# Patient Record
Sex: FEMALE | Race: WHITE | NOT HISPANIC OR LATINO | URBAN - NONMETROPOLITAN AREA
[De-identification: names, ages, dates, MRNs, and addresses within clinical notes are randomized per-mention and may not be internally consistent; named-entity substitution may affect disease eponyms.]

---

## 2020-07-23 LAB
CLINICAL INFO: NORMAL
CYTO CVX: NORMAL
CYTOLOGY CMNT CVX/VAG CYTO-IMP: NORMAL
DATE PREVIOUS BX: NORMAL
LMP START DATE: NORMAL
MICROORGANISM CVX/VAG CYTO: NORMAL
SL AMB PREV. PAP:: NORMAL
SPECIMEN SOURCE CVX/VAG CYTO: NORMAL

## 2020-08-06 LAB
C TRACH RRNA SPEC QL NAA+PROBE: NOT DETECTED
N GONORRHOEA RRNA SPEC QL NAA+PROBE: NOT DETECTED

## 2023-10-09 DIAGNOSIS — Q23.1 CONGENITAL INSUFFICIENCY OF AORTIC VALVE: ICD-10-CM

## 2023-10-09 DIAGNOSIS — I35.0 NONRHEUMATIC AORTIC (VALVE) STENOSIS: ICD-10-CM

## 2024-03-19 ENCOUNTER — HOSPITAL ENCOUNTER (OUTPATIENT)
Dept: CT IMAGING | Facility: HOSPITAL | Age: 29
Discharge: HOME | End: 2024-03-19
Attending: INTERNAL MEDICINE
Payer: COMMERCIAL

## 2024-03-19 DIAGNOSIS — I71.21 ANEURYSM OF THE ASCENDING AORTA, WITHOUT RUPTURE (HCC): ICD-10-CM

## 2024-03-19 PROCEDURE — 71275 CT ANGIOGRAPHY CHEST: CPT

## 2024-03-19 RX ADMIN — IOHEXOL 85 ML: 350 INJECTION, SOLUTION INTRAVENOUS at 08:56

## 2024-04-05 ENCOUNTER — HOSPITAL ENCOUNTER (OUTPATIENT)
Dept: NON INVASIVE DIAGNOSTICS | Facility: HOSPITAL | Age: 29
Discharge: HOME | End: 2024-04-05
Attending: INTERNAL MEDICINE
Payer: COMMERCIAL

## 2024-04-05 VITALS — WEIGHT: 150 LBS | HEIGHT: 65 IN | BODY MASS INDEX: 24.99 KG/M2

## 2024-04-05 DIAGNOSIS — Q23.1 CONGENITAL INSUFFICIENCY OF AORTIC VALVE: ICD-10-CM

## 2024-04-05 DIAGNOSIS — I35.0 NONRHEUMATIC AORTIC (VALVE) STENOSIS: ICD-10-CM

## 2024-04-05 LAB
AORTIC ROOT: 3.6 CM
AORTIC VALVE MEAN VELOCITY: 18 M/S
APICAL FOUR CHAMBER EJECTION FRACTION: 60 %
ASCENDING AORTA: 2.8 CM
AV AREA BY CONTINUOUS VTI: 1.3 CM2
AV AREA PEAK VELOCITY: 1.3 CM2
AV LVOT MEAN GRADIENT: 1 MMHG
AV LVOT PEAK GRADIENT: 3 MMHG
AV MEAN GRADIENT: 15 MMHG
AV PEAK GRADIENT: 26 MMHG
AV REGURGITATION PRESSURE HALF TIME: 470 MS
AV VALVE AREA: 1.29 CM2
AV VELOCITY RATIO: 0.31
BSA FOR ECHO PROCEDURE: 1.75 M2
DOP CALC AO PEAK VEL: 2.56 M/S
DOP CALC AO VTI: 59.77 CM
DOP CALC LVOT AREA: 4.15 CM2
DOP CALC LVOT CARDIAC OUTPUT: 5.97 L/MIN
DOP CALC LVOT DIAMETER: 2.3 CM
DOP CALC LVOT PEAK VEL VTI: 18.51 CM
DOP CALC LVOT PEAK VEL: 0.79 M/S
DOP CALC LVOT STROKE VOLUME: 76.87
E WAVE DECELERATION TIME: 142 MS
E/A RATIO: 1.54
FRACTIONAL SHORTENING: 30 (ref 28–44)
INTERVENTRICULAR SEPTUM IN DIASTOLE (PARASTERNAL SHORT AXIS VIEW): 1.2 CM
INTERVENTRICULAR SEPTUM: 1.2 CM (ref 0.6–1.1)
LAAS-AP2: 14.7 CM2
LAAS-AP4: 14.1 CM2
LEFT ATRIUM SIZE: 2.9 CM
LEFT ATRIUM VOLUME (MOD BIPLANE): 38 ML
LEFT INTERNAL DIMENSION IN SYSTOLE: 3 CM (ref 2.1–4)
LEFT VENTRICLE DIASTOLIC VOLUME (MOD BIPLANE): 102 ML
LEFT VENTRICLE DIASTOLIC VOLUME INDEX (MOD BIPLANE): 58.3 ML/M2
LEFT VENTRICLE SYSTOLIC VOLUME (MOD BIPLANE): 40 ML
LEFT VENTRICLE SYSTOLIC VOLUME INDEX (MOD BIPLANE): 22.9 ML/M2
LEFT VENTRICULAR INTERNAL DIMENSION IN DIASTOLE: 4.3 CM (ref 3.5–6)
LEFT VENTRICULAR POSTERIOR WALL IN END DIASTOLE: 1 CM
LEFT VENTRICULAR STROKE VOLUME: 50 ML
LV EF: 61 %
LVSV (TEICH): 50 ML
MV E'TISSUE VEL-LAT: 15 CM/S
MV E'TISSUE VEL-SEP: 10 CM/S
MV PEAK A VEL: 0.48 M/S
MV PEAK E VEL: 74 CM/S
MV STENOSIS PRESSURE HALF TIME: 41 MS
MV VALVE AREA P 1/2 METHOD: 5.37
RIGHT ATRIUM AREA SYSTOLE A4C: 17.8 CM2
RIGHT VENTRICLE ID DIMENSION: 3 CM
SINOTUBULAR JUNCTION: 2.6 CM
SL CV AV DECELERATION TIME RETROGRADE: 1621 MS
SL CV AV PEAK GRADIENT RETROGRADE: 41 MMHG
SL CV LEFT ATRIUM LENGTH A2C: 4.3 CM
SL CV LV EF: 60
SL CV PED ECHO LEFT VENTRICLE DIASTOLIC VOLUME (MOD BIPLANE) 2D: 85 ML
SL CV PED ECHO LEFT VENTRICLE SYSTOLIC VOLUME (MOD BIPLANE) 2D: 36 ML
SL CV SINUS OF VALSALVA 2D: 3.8 CM
STJ: 2.6 CM
TRICUSPID ANNULAR PLANE SYSTOLIC EXCURSION: 2.7 CM

## 2024-04-05 PROCEDURE — 93306 TTE W/DOPPLER COMPLETE: CPT

## 2024-07-26 ENCOUNTER — OFFICE VISIT (OUTPATIENT)
Dept: URGENT CARE | Facility: CLINIC | Age: 29
End: 2024-07-26
Payer: COMMERCIAL

## 2024-07-26 VITALS
HEART RATE: 86 BPM | DIASTOLIC BLOOD PRESSURE: 92 MMHG | BODY MASS INDEX: 24.16 KG/M2 | WEIGHT: 145 LBS | TEMPERATURE: 97.4 F | OXYGEN SATURATION: 99 % | RESPIRATION RATE: 16 BRPM | SYSTOLIC BLOOD PRESSURE: 138 MMHG | HEIGHT: 65 IN

## 2024-07-26 DIAGNOSIS — I10 HYPERTENSION, UNSPECIFIED TYPE: Primary | ICD-10-CM

## 2024-07-26 PROCEDURE — 99213 OFFICE O/P EST LOW 20 MIN: CPT

## 2024-07-26 PROCEDURE — S9088 SERVICES PROVIDED IN URGENT: HCPCS

## 2024-07-26 PROCEDURE — 93005 ELECTROCARDIOGRAM TRACING: CPT

## 2024-07-26 RX ORDER — PANTOPRAZOLE SODIUM 40 MG/1
1 TABLET, DELAYED RELEASE ORAL EVERY MORNING
COMMUNITY
Start: 2024-06-17

## 2024-07-26 RX ORDER — SEMAGLUTIDE 0.68 MG/ML
0.5 INJECTION, SOLUTION SUBCUTANEOUS
COMMUNITY
Start: 2024-06-10

## 2024-07-26 RX ORDER — LEVONORGESTREL 52 MG/1
1 INTRAUTERINE DEVICE INTRAUTERINE
COMMUNITY

## 2024-07-26 RX ORDER — BACLOFEN 10 MG/1
TABLET ORAL
COMMUNITY
Start: 2024-06-03 | End: 2025-06-03

## 2024-07-26 RX ORDER — FLUOXETINE HYDROCHLORIDE 20 MG/1
20 CAPSULE ORAL DAILY
COMMUNITY
Start: 2024-06-18

## 2024-07-26 RX ORDER — LABETALOL 100 MG/1
TABLET, FILM COATED ORAL
COMMUNITY
Start: 2024-02-26

## 2024-07-26 RX ORDER — LORATADINE 10 MG/1
10 TABLET ORAL DAILY
COMMUNITY

## 2024-07-26 RX ORDER — LAMOTRIGINE 100 MG/1
TABLET ORAL
COMMUNITY
Start: 2024-07-06

## 2024-07-26 NOTE — PATIENT INSTRUCTIONS
Monitor symptoms and proceed to ER if worsening  Follow up with PCP in 3-5 days.  Proceed to  ER if symptoms worsen.    If tests are performed, our office will contact you with results only if changes need to made to the care plan discussed with you at the visit. You can review your full results on [facility].

## 2024-07-26 NOTE — LETTER
July 26, 2024     Patient: Kaylynn Hardy   YOB: 1995   Date of Visit: 7/26/2024       To Whom It May Concern:    It is my medical opinion that Kaylynn Hardy may return to work on 7/27/2024 .    If you have any questions or concerns, please don't hesitate to call.         Sincerely,        Jairo Valencia PA-C    CC: No Recipients

## 2024-07-26 NOTE — PROGRESS NOTES
[facility]        NAME: Kaylynn Hardy is a 29 y.o. female  : 1995    MRN: [id number]  DATE: 2024  TIME: 2:06 PM    Assessment and Plan   Hypertension, unspecified type [I10]  1. Hypertension, unspecified type  ECG 12 lead    ECG 12 lead        EK bpm, normal sinus rhythm    Patient stated her symptoms are starting to resolve.  BP is coming down while in office.  Went over signs and symptoms of when to proceed to ER.  Advised to closely monitor BP and symptoms.  Patient verbalized understanding.    Patient Instructions     Monitor symptoms and proceed to ER if worsening  Follow up with PCP in 3-5 days.  Proceed to  ER if symptoms worsen.    If tests are performed, our office will contact you with results only if changes need to made to the care plan discussed with you at the visit. You can review your full results on [facility].    Chief Complaint     Chief Complaint   Patient presents with    Dizziness     Became light headed and dizzy approx 1 hour. /99 which was taken at her employment         History of Present Illness       Patient with a history of hypertension is presenting with dizziness, lightheadedness, and high blood pressure reading that began about 1 hour ago at work.  She had a BP reading of 157/99 1 hour ago.  She stated her symptoms are starting to subside.  She denies any chest pain, shortness of breath, or heart palpitations.  She stated she did have a dull headache and slightly blurry ridge vision which both have resolved.  She stated this has happened in the past and she had to go to the ER before but this reading is lower than her other ones.  She is compliant with her medications.  She stated that she did have breakfast this morning.  Before coming in she checked her blood glucose which was 91.    Dizziness  This is a new problem. The current episode started today (1 hour ago). Associated symptoms include headaches (resolved). Pertinent negatives  "include no numbness or weakness.       Review of Systems   Review of Systems   Constitutional: Negative.    Eyes:  Positive for visual disturbance (blurry vision resolved).   Respiratory: Negative.     Cardiovascular: Negative.    Skin: Negative.    Neurological:  Positive for dizziness, light-headedness and headaches (resolved). Negative for weakness and numbness.         Current Medications       Current Outpatient Medications:     baclofen 10 mg tablet, TAKE ONE TABLET BY MOUTH IN THE MORNING AND ONE TABLET AT NOON AND ONE TABLET BEFORE BEDTIME, Disp: , Rfl:     FLUoxetine (PROzac) 20 mg capsule, Take 20 mg by mouth daily, Disp: , Rfl:     labetalol (NORMODYNE) 100 mg tablet, TAKE ONE TABLET BY MOUTH IN THE MORNING AND ONE TABLET BEFORE BEDTIME, Disp: , Rfl:     lamoTRIgine (LaMICtal) 100 mg tablet, , Disp: , Rfl:     Levonorgestrel (Mirena, 52 MG,) 20 MCG/DAY IUD, 1 each by Intrauterine route, Disp: , Rfl:     loratadine (CLARITIN) 10 mg tablet, Take 10 mg by mouth daily, Disp: , Rfl:     Ozempic, 0.25 or 0.5 MG/DOSE, 2 MG/3ML injection pen, Inject 0.5 mg under the skin, Disp: , Rfl:     pantoprazole (PROTONIX) 40 mg tablet, Take 1 tablet by mouth every morning, Disp: , Rfl:     Current Allergies     Allergies as of 07/26/2024    (No Known Allergies)            The following portions of the patient's history were reviewed and updated as appropriate: allergies, current medications, past family history, past medical history, past social history, past surgical history and problem list.     Past Medical History:   Diagnosis Date    Anxiety     Aortic aneurysm (HCC)     Aortic stenosis     Depression     Fibromyalgia     Hypertension        Past Surgical History:   Procedure Laterality Date    NO PAST SURGERIES         History reviewed. No pertinent family history.      Medications have been verified.        Objective   /92   Pulse 86   Temp (!) 97.4 °F (36.3 °C)   Resp 16   Ht 5' 5\" (1.651 m)   Wt 65.8 kg " (145 lb)   SpO2 99%   BMI 24.13 kg/m²        Physical Exam     Physical Exam  Constitutional:       General: She is not in acute distress.     Appearance: Normal appearance. She is not ill-appearing.   HENT:      Head: Normocephalic.      Nose: No congestion.      Mouth/Throat:      Mouth: Mucous membranes are moist.      Pharynx: Oropharynx is clear.   Eyes:      Extraocular Movements: Extraocular movements intact.      Conjunctiva/sclera: Conjunctivae normal.      Pupils: Pupils are equal, round, and reactive to light.   Cardiovascular:      Rate and Rhythm: Normal rate and regular rhythm.      Pulses: Normal pulses.      Heart sounds: Normal heart sounds.   Pulmonary:      Effort: Pulmonary effort is normal. No respiratory distress.      Breath sounds: Normal breath sounds. No stridor. No wheezing, rhonchi or rales.   Lymphadenopathy:      Cervical: No cervical adenopathy.   Skin:     General: Skin is warm and dry.   Neurological:      General: No focal deficit present.      Mental Status: She is alert and oriented to person, place, and time. Mental status is at baseline.   Psychiatric:         Mood and Affect: Mood normal.         Behavior: Behavior normal.         Thought Content: Thought content normal.         Judgment: Judgment normal.

## 2024-07-28 LAB
ATRIAL RATE: 70 BPM
P AXIS: 21 DEGREES
PR INTERVAL: 154 MS
QRS AXIS: 42 DEGREES
QRSD INTERVAL: 92 MS
QT INTERVAL: 384 MS
QTC INTERVAL: 414 MS
T WAVE AXIS: -16 DEGREES
VENTRICULAR RATE: 70 BPM

## 2024-07-28 PROCEDURE — 93010 ELECTROCARDIOGRAM REPORT: CPT | Performed by: INTERNAL MEDICINE
